# Patient Record
Sex: FEMALE | Race: WHITE
[De-identification: names, ages, dates, MRNs, and addresses within clinical notes are randomized per-mention and may not be internally consistent; named-entity substitution may affect disease eponyms.]

---

## 2017-09-21 ENCOUNTER — HOSPITAL ENCOUNTER (OUTPATIENT)
Dept: HOSPITAL 47 - LABWHC1 | Age: 47
Discharge: HOME | End: 2017-09-21
Payer: COMMERCIAL

## 2017-09-21 DIAGNOSIS — Z00.00: Primary | ICD-10-CM

## 2017-09-21 LAB
ALP SERPL-CCNC: 89 U/L (ref 38–126)
ALT SERPL-CCNC: 33 U/L (ref 9–52)
ANION GAP SERPL CALC-SCNC: 10 MMOL/L
AST SERPL-CCNC: 23 U/L (ref 14–36)
BASOPHILS # BLD AUTO: 0.1 K/UL (ref 0–0.2)
BASOPHILS NFR BLD AUTO: 1 %
BUN SERPL-SCNC: 14 MG/DL (ref 7–17)
CALCIUM SPEC-MCNC: 8.7 MG/DL (ref 8.4–10.2)
CH: 28.4
CHCM: 34.9
CHLORIDE SERPL-SCNC: 106 MMOL/L (ref 98–107)
CHOLEST SERPL-MCNC: 223 MG/DL (ref ?–200)
CO2 SERPL-SCNC: 24 MMOL/L (ref 22–30)
EOSINOPHIL # BLD AUTO: 0.5 K/UL (ref 0–0.7)
EOSINOPHIL NFR BLD AUTO: 8 %
ERYTHROCYTE [DISTWIDTH] IN BLOOD BY AUTOMATED COUNT: 5.36 M/UL (ref 3.8–5.4)
ERYTHROCYTE [DISTWIDTH] IN BLOOD: 14.6 % (ref 11.5–15.5)
GLUCOSE SERPL-MCNC: 83 MG/DL (ref 74–99)
HCT VFR BLD AUTO: 43.9 % (ref 34–46)
HDLC SERPL-MCNC: 74 MG/DL (ref 40–60)
HDW: 2.72
HEMOGLOBIN A1C: 5.1 % (ref 4.2–6.1)
HGB BLD-MCNC: 14.7 GM/DL (ref 11.4–16)
LUC NFR BLD AUTO: 1 %
LYMPHOCYTES # SPEC AUTO: 1.6 K/UL (ref 1–4.8)
LYMPHOCYTES NFR SPEC AUTO: 27 %
MCH RBC QN AUTO: 27.3 PG (ref 25–35)
MCHC RBC AUTO-ENTMCNC: 33.4 G/DL (ref 31–37)
MCV RBC AUTO: 81.9 FL (ref 80–100)
MONOCYTES # BLD AUTO: 0.3 K/UL (ref 0–1)
MONOCYTES NFR BLD AUTO: 6 %
NEUTROPHILS # BLD AUTO: 3.4 K/UL (ref 1.3–7.7)
NEUTROPHILS NFR BLD AUTO: 57 %
NON-AFRICAN AMERICAN GFR(MDRD): >60
POTASSIUM SERPL-SCNC: 4.4 MMOL/L (ref 3.5–5.1)
PROT SERPL-MCNC: 7 G/DL (ref 6.3–8.2)
SODIUM SERPL-SCNC: 140 MMOL/L (ref 137–145)
WBC # BLD AUTO: 0.08 10*3/UL
WBC # BLD AUTO: 5.8 K/UL (ref 3.8–10.6)
WBC (PEROX): 5.55

## 2017-09-21 PROCEDURE — 83036 HEMOGLOBIN GLYCOSYLATED A1C: CPT

## 2017-09-21 PROCEDURE — 84590 ASSAY OF VITAMIN A: CPT

## 2017-09-21 PROCEDURE — 85025 COMPLETE CBC W/AUTO DIFF WBC: CPT

## 2017-09-21 PROCEDURE — 84597 ASSAY OF VITAMIN K: CPT

## 2017-09-21 PROCEDURE — 36415 COLL VENOUS BLD VENIPUNCTURE: CPT

## 2017-09-21 PROCEDURE — 84439 ASSAY OF FREE THYROXINE: CPT

## 2017-09-21 PROCEDURE — 80061 LIPID PANEL: CPT

## 2017-09-21 PROCEDURE — 84443 ASSAY THYROID STIM HORMONE: CPT

## 2017-09-21 PROCEDURE — 84446 ASSAY OF VITAMIN E: CPT

## 2017-09-21 PROCEDURE — 82306 VITAMIN D 25 HYDROXY: CPT

## 2017-09-21 PROCEDURE — 80053 COMPREHEN METABOLIC PANEL: CPT

## 2017-10-13 ENCOUNTER — HOSPITAL ENCOUNTER (OUTPATIENT)
Dept: HOSPITAL 47 - RADMAMWWP | Age: 47
Discharge: HOME | End: 2017-10-13
Payer: COMMERCIAL

## 2017-10-13 DIAGNOSIS — Z12.31: Primary | ICD-10-CM

## 2017-10-13 PROCEDURE — 77063 BREAST TOMOSYNTHESIS BI: CPT

## 2017-10-16 NOTE — MM
Reason for exam: screening  (asymptomatic).

Last mammogram was performed 1 year ago.



History:

Family history of breast cancer in maternal aunt and breast cancer in grandmother.

Taking hormonal contraceptives for 3 years beginning at age 37.



Physical Findings:

A clinical breast exam by your physician is recommended on an annual basis and 

results should be correlated with mammographic findings.



MG 3D Screening Mammo W/Cad

Bilateral CC and MLO view(s) were taken.

Prior study comparison: September 29, 2016, bilateral MG 3d screening mammo w/cad.

June 29, 2011, [facility] DIGITAL RIGHT MAMMOGRAM w/CAD.

There are scattered fibroglandular densities.  Stable left retroareolar focal 

asymmetry back to 2011.





ASSESSMENT: Benign, BI-RAD 2



RECOMMENDATION:

Routine screening mammogram of both breasts in 1 year.

## 2019-01-15 ENCOUNTER — HOSPITAL ENCOUNTER (OUTPATIENT)
Dept: HOSPITAL 47 - LABWHC1 | Age: 49
Discharge: HOME | End: 2019-01-15
Attending: OBSTETRICS & GYNECOLOGY
Payer: COMMERCIAL

## 2019-01-15 DIAGNOSIS — R68.89: Primary | ICD-10-CM

## 2019-01-15 DIAGNOSIS — Z78.0: ICD-10-CM

## 2019-01-15 DIAGNOSIS — R53.83: ICD-10-CM

## 2019-01-15 PROCEDURE — 84144 ASSAY OF PROGESTERONE: CPT

## 2019-01-15 PROCEDURE — 83001 ASSAY OF GONADOTROPIN (FSH): CPT

## 2019-01-15 PROCEDURE — 82670 ASSAY OF TOTAL ESTRADIOL: CPT

## 2019-01-15 PROCEDURE — 84403 ASSAY OF TOTAL TESTOSTERONE: CPT

## 2019-01-15 PROCEDURE — 36415 COLL VENOUS BLD VENIPUNCTURE: CPT

## 2019-10-08 ENCOUNTER — HOSPITAL ENCOUNTER (OUTPATIENT)
Dept: HOSPITAL 47 - RADMAMWWP | Age: 49
Discharge: HOME | End: 2019-10-08
Attending: OBSTETRICS & GYNECOLOGY
Payer: COMMERCIAL

## 2019-10-08 DIAGNOSIS — Z12.31: Primary | ICD-10-CM

## 2019-10-08 PROCEDURE — 77063 BREAST TOMOSYNTHESIS BI: CPT

## 2019-10-08 PROCEDURE — 77067 SCR MAMMO BI INCL CAD: CPT

## 2019-10-10 NOTE — MM
Reason for exam: screening  (asymptomatic).

Last mammogram was performed 2 years ago.



History:

Family history of breast cancer in maternal aunt and breast cancer in grandmother.

Taking hormonal contraceptives for 3 years beginning at age 37.



Physical Findings:

A clinical breast exam by your physician is recommended on an annual basis and 

results should be correlated with mammographic findings.



MG 3D Screening Mammo W/Cad

Bilateral CC and MLO view(s) were taken.

Prior study comparison: October 13, 2017, bilateral MG 3d screening mammo w/cad.  

September 29, 2016, bilateral MG 3d screening mammo w/cad.

There are scattered fibroglandular densities.  There are benign appearing stable 

right periareolar calcifications.No suspicious abnormality.  No significant 

changes when compared with prior studies.





ASSESSMENT: Benign, BI-RAD 2



RECOMMENDATION:

Routine screening mammogram of both breasts in 1 year.

## 2020-01-22 ENCOUNTER — HOSPITAL ENCOUNTER (OUTPATIENT)
Dept: HOSPITAL 47 - ORWHC2ENDO | Age: 50
Discharge: HOME | End: 2020-01-22
Payer: COMMERCIAL

## 2020-01-22 VITALS — DIASTOLIC BLOOD PRESSURE: 78 MMHG | HEART RATE: 68 BPM | RESPIRATION RATE: 16 BRPM | SYSTOLIC BLOOD PRESSURE: 124 MMHG

## 2020-01-22 VITALS — BODY MASS INDEX: 33.3 KG/M2

## 2020-01-22 VITALS — TEMPERATURE: 97.7 F

## 2020-01-22 DIAGNOSIS — K64.0: ICD-10-CM

## 2020-01-22 DIAGNOSIS — Z12.11: Primary | ICD-10-CM

## 2020-01-22 DIAGNOSIS — Z86.010: ICD-10-CM

## 2020-01-22 DIAGNOSIS — Z98.890: ICD-10-CM

## 2020-01-22 DIAGNOSIS — E66.9: ICD-10-CM

## 2020-01-22 PROCEDURE — 81025 URINE PREGNANCY TEST: CPT

## 2020-01-22 NOTE — P.GSHP
History of Present Illness


H&P Date: 20














CHIEF COMPLAINT: Colon screen





HISTORY OF PRESENT ILLNESS: The patient is a 49-year-old female who


presents for colon screen.  Lower endoscopy was offered for further evaluation 

and management.





PAST MEDICAL HISTORY: 


Please see list.





PAST SURGICAL HISTORY: 


Please see list.





MEDICATIONS: 


Please see list.





ALLERGIES:  Please see list. 





SOCIAL HISTORY: No illicit drug use





FAMILY HISTORY: No reports of Crohn disease or ulcerative colitis. 





REVIEW OF ORGAN SYSTEMS: 


CONSTITUTIONAL: No reports of fevers or chills.  





PHYSICAL EXAM: 


VITAL SIGNS:  Stable


GENERAL: Well-developed pleasant in no acute distress. 


HEENT: No scleral icterus. Extraocular movements grossly


intact. Moist buccal mucosa. 


NECK: Supple without lymphadenopathy. 


CHEST: Unlabored respirations. Equal bilateral excursions. 


CARDIOVASCULAR: Regular rate and rhythm. Distal 2+ pulses. 


ABDOMEN: Soft, nontender, nondistended. 


MUSCULOSKELETAL: No clubbing, cyanosis, or edema. 





ASSESSMENT: 


1.  Colon screen.





PLAN: 


1. Recommend proceeding with a lower endoscopy





Past Medical History


Additional Past Medical History / Comment(s): hx colon polyps


History of Any Multi-Drug Resistant Organisms: None Reported


Past Surgical History:  Section


Additional Past Surgical History / Comment(s): bunionectomy x3


Past Anesthesia/Blood Transfusion Reactions: No Reported Reaction


Smoking Status: Never smoker





- Past Family History


  ** Mother


Family Medical History: No Reported History





Medications and Allergies


                                Home Medications











 Medication  Instructions  Recorded  Confirmed  Type


 


Omega-3 Fatty Acids/Fish Oil [Fish 1 each PO DAILY 20 History





Oil 1,000 mg Softgel]    








                                    Allergies











Allergy/AdvReac Type Severity Reaction Status Date / Time


 


No Known Allergies Allergy   Verified 20 11:46

## 2020-01-22 NOTE — P.OP
Date of Procedure: 01/22/20


Description of Procedure: 











PREOPERATIVE DIAGNOSIS:


Personal history of colon polyps


Colonoscopy screening.





POSTOPERATIVE DIAGNOSIS:


Personal history of colon polyps


Colonoscopy screening.


Diverticulosis, scattered.





OPERATION:


Colonoscopy to the ileocecal valve and appendiceal orifice.





SURGEON: Deysi Cornelius MD.





ANESTHESIA: MAC.





INDICATIONS:


The patient is a 49-year-old female who presents for colonoscopy screening.  

Last colonoscopy over 30 years ago.  Benefits and risks were described and inf

ormed consent was obtained.





DESCRIPTION OF PROCEDURE:


The patient had undergone Suprep. She had been brought into the operating room 

and laid in the left lateral decubitus position. After adequate intravenous 

sedation, the rectum was examined with 2% lidocaine jelly. No external 

hemorrhoids were encountered. The rectal tone was within normal limits. No 

lesions were palpated in the rectal vault. An Olympus colonoscope was advanced 

until the ileocecal valve and appendiceal orifice were clearly viewed.  The prep

was excellent with clear visualization of the mucosal folds.   The scope was 

removed with visualization of each mucosal fold.  Scattered diverticulosis was 

encountered.  No colonic polyps were found.  No evidence of focal colitis was 

found. Retroflexion of the scope demonstrated grade 1 internal hemorrhoids 

without active bleeding or inflammation. The colon was desufflated. The patient 

had tolerated the procedure well. 





Withdrawal time was over 6 minutes.





FINDINGS:


Aronchick preparation quality scale 1 (1-5)


Internal hemorrhoids, grade 1


No external prolapsed hemorrhoids.


No arteriovenous malformations.


No adenomatous polyps.


No focal colitis.





RECOMMENDATIONS:


Lower endoscopy in 5 years, 2025





Plan - Discharge Summary


Discharge Rx Participant: Yes


New Discharge Prescriptions: 


No Action


   Omega-3 Fatty Acids/Fish Oil [Fish Oil 1,000 mg Softgel] 1 each PO DAILY


Discharge Medication List





Omega-3 Fatty Acids/Fish Oil [Fish Oil 1,000 mg Softgel] 1 each PO DAILY 

01/20/20 [History]








Follow up Appointment(s)/Referral(s): 


Deysi Cornelius MD [STAFF PHYSICIAN] - As Needed


Patient Instructions/Handouts:  Diverticulosis Diet (GEN), Diverticulosis (DC)


Activity/Diet/Wound Care/Special Instructions: 


Repeat colonoscopy in 5 years, 2025


Discharge Disposition: HOME SELF-CARE

## 2021-01-21 ENCOUNTER — HOSPITAL ENCOUNTER (OUTPATIENT)
Dept: HOSPITAL 47 - LABWHC1 | Age: 51
Discharge: HOME | End: 2021-01-21
Attending: PLASTIC SURGERY
Payer: COMMERCIAL

## 2021-01-21 DIAGNOSIS — Z01.812: Primary | ICD-10-CM

## 2021-02-23 ENCOUNTER — HOSPITAL ENCOUNTER (OUTPATIENT)
Dept: HOSPITAL 47 - RADMAMWWP | Age: 51
Discharge: HOME | End: 2021-02-23
Attending: OBSTETRICS & GYNECOLOGY
Payer: COMMERCIAL

## 2021-02-23 DIAGNOSIS — Z80.3: ICD-10-CM

## 2021-02-23 DIAGNOSIS — Z12.31: Primary | ICD-10-CM

## 2021-02-23 PROCEDURE — 77067 SCR MAMMO BI INCL CAD: CPT

## 2021-02-23 PROCEDURE — 77063 BREAST TOMOSYNTHESIS BI: CPT

## 2021-02-24 NOTE — MM
Reason for exam: screening  (asymptomatic).

Last mammogram was performed 1 year and 4 months ago.



History:

Family history of breast cancer in maternal aunt and breast cancer in grandmother.

Taking hormonal contraceptives for 3 years beginning at age 37.



Physical Findings:

A clinical breast exam by your physician is recommended on an annual basis and 

results should be correlated with mammographic findings.



MG 3D Screening Mammo W/Cad

Bilateral CC and MLO view(s) were taken.

Prior study comparison: October 8, 2019, bilateral MG 3d screening mammo w/cad.  

October 13, 2017, bilateral MG 3d screening mammo w/cad.

There are scattered fibroglandular densities.  Stable benign calcifications.  No 

significant changes when compared with prior studies.





ASSESSMENT: Benign, BI-RAD 2



RECOMMENDATION:

Routine screening mammogram of both breasts in 1 year.

## 2021-09-30 ENCOUNTER — HOSPITAL ENCOUNTER (OUTPATIENT)
Dept: HOSPITAL 47 - LABWHC1 | Age: 51
Discharge: HOME | End: 2021-09-30
Attending: OBSTETRICS & GYNECOLOGY
Payer: COMMERCIAL

## 2021-09-30 DIAGNOSIS — R37: ICD-10-CM

## 2021-09-30 DIAGNOSIS — N95.1: Primary | ICD-10-CM

## 2021-09-30 DIAGNOSIS — R53.83: ICD-10-CM

## 2021-09-30 PROCEDURE — 82670 ASSAY OF TOTAL ESTRADIOL: CPT

## 2021-09-30 PROCEDURE — 83001 ASSAY OF GONADOTROPIN (FSH): CPT

## 2021-09-30 PROCEDURE — 84144 ASSAY OF PROGESTERONE: CPT

## 2021-09-30 PROCEDURE — 36415 COLL VENOUS BLD VENIPUNCTURE: CPT

## 2021-09-30 PROCEDURE — 84403 ASSAY OF TOTAL TESTOSTERONE: CPT

## 2021-10-01 LAB — FSH SERPL-ACNC: 17.8 MIU/ML

## 2023-01-11 ENCOUNTER — HOSPITAL ENCOUNTER (OUTPATIENT)
Dept: HOSPITAL 47 - RADMAMWWP | Age: 53
Discharge: HOME | End: 2023-01-11
Attending: OBSTETRICS & GYNECOLOGY
Payer: COMMERCIAL

## 2023-01-11 DIAGNOSIS — Z12.31: Primary | ICD-10-CM

## 2023-01-11 DIAGNOSIS — Z80.3: ICD-10-CM

## 2023-01-11 PROCEDURE — 77067 SCR MAMMO BI INCL CAD: CPT

## 2023-01-11 PROCEDURE — 77063 BREAST TOMOSYNTHESIS BI: CPT

## 2023-01-12 NOTE — MM
Reason for Exam: Screening  (asymptomatic). 

Last mammogram was performed 1 year(s) and 11 month(s) ago. 





Patient History: 

Menarche at age 12. Currently using Estrogen, starting at age 48. Currently using Hormonal

Contraceptives, beginning at age 37 for 3 years.

Maternal grandmother had breast cancer. Maternal aunt had breast cancer. 





Risk Values: 

[place] 5 year model risk: 0.8%.

NCI Lifetime model risk: 6.2%.





Prior Study Comparison: 

10/13/2017 Bilateral Screening Mammogram, [facility]. 10/8/2019 Bilateral Screening Mammogram, [facility].

2/23/2021 Bilateral Screening Mammogram, [facility]. 





Tissue Density: 

There are scattered fibroglandular densities.





Findings: 

Analyzed By CAD. 

A few scattered tiny benign-appearing round calcifications throughout the bilateral breasts are

redemonstrated.



There is no suspicious group of microcalcifications or new suspicious mass in either breast. 





Overall Assessment: Negative, BI-RAD 1





Management: 

Screening Mammogram of both breasts in 1 year.

A clinical breast exam by your physician is recommended on an annual basis and results should be

correlated with mammographic findings.



Electronically signed and approved by: Delmar Garcia M.D.

## 2024-11-07 ENCOUNTER — HOSPITAL ENCOUNTER (OUTPATIENT)
Dept: HOSPITAL 47 - RADMAMWWP | Age: 54
Discharge: HOME | End: 2024-11-07
Attending: OBSTETRICS & GYNECOLOGY
Payer: COMMERCIAL

## 2024-11-07 DIAGNOSIS — Z12.31: Primary | ICD-10-CM

## 2024-11-07 DIAGNOSIS — R92.313: ICD-10-CM

## 2024-11-07 DIAGNOSIS — Z80.3: ICD-10-CM

## 2024-11-07 PROCEDURE — 77067 SCR MAMMO BI INCL CAD: CPT

## 2024-11-07 PROCEDURE — 77063 BREAST TOMOSYNTHESIS BI: CPT
